# Patient Record
(demographics unavailable — no encounter records)

---

## 2025-07-08 NOTE — DISCUSSION/SUMMARY
[FreeTextEntry1] : 24M presents to establish care  Feeling well Euvolemic EKG showing SR No cp or sob pt with elevated bp at pre-employment physical for [employer], bp normal here, likely nervous at prev appt pt requiring pre-employment tte/stress, will set up asap  HLD -pt states hx of hld -primary to check labs -discussed importance of eating healthy   f/u after initial testing 45 min spent on complete encounter    [EKG obtained to assist in diagnosis and management of assessed problem(s)] : EKG obtained to assist in diagnosis and management of assessed problem(s)

## 2025-07-08 NOTE — HISTORY OF PRESENT ILLNESS
[FreeTextEntry1] : 24M presents to establish care Sent in by: TREMAYNE PMD:   pt overall feeling well. denies CP, SOB a rest or on exertion. Denies palpitations, dizziness, diaphoresis, syncope edema, orthopnea. Denies HA, blurry vision  Exercise: lifting, play golf. played college lacrosse.  Diet: none  Prev cardiac history: none Previous cardiac testing: none Recent labs:  EKG:  SR   Med hx: none Sx hx: testicular torsion age 9, hernia, undescended reticle at at 8 months. tonsils, adenoids Family hx: no known cardiac hx Social hx: lives in [place], planning to work for [employer]. denies tob/vape/etoh/drugs Meds: none Allergies: pcn (hives)

## 2025-07-08 NOTE — PHYSICAL EXAM
[Well Developed] : well developed [Well Nourished] : well nourished [No Acute Distress] : no acute distress [Normal Venous Pressure] : normal venous pressure [Normal S1, S2] : normal S1, S2 [No Murmur] : no murmur [Good Air Entry] : good air entry [No Respiratory Distress] : no respiratory distress  [Soft] : abdomen soft [Non Tender] : non-tender [Normal Gait] : normal gait [No Edema] : no edema [Moves all extremities] : moves all extremities [No Focal Deficits] : no focal deficits [Alert and Oriented] : alert and oriented